# Patient Record
Sex: MALE | Race: WHITE | URBAN - METROPOLITAN AREA
[De-identification: names, ages, dates, MRNs, and addresses within clinical notes are randomized per-mention and may not be internally consistent; named-entity substitution may affect disease eponyms.]

---

## 2018-07-16 ENCOUNTER — APPOINTMENT (RX ONLY)
Dept: URBAN - METROPOLITAN AREA CLINIC 120 | Facility: CLINIC | Age: 28
Setting detail: DERMATOLOGY
End: 2018-07-16

## 2018-07-16 DIAGNOSIS — L70.1 ACNE CONGLOBATA: ICD-10-CM

## 2018-07-16 DIAGNOSIS — L73.2 HIDRADENITIS SUPPURATIVA: ICD-10-CM

## 2018-07-16 PROBLEM — L70.0 ACNE VULGARIS: Status: ACTIVE | Noted: 2018-07-16

## 2018-07-16 PROCEDURE — ? ADDITIONAL NOTES

## 2018-07-16 PROCEDURE — ? LAB REPORTS REVIEWED

## 2018-07-16 PROCEDURE — ? HIGH RISK MEDICATION MONITORING

## 2018-07-16 PROCEDURE — ? ISOTRETINOIN INITIATION

## 2018-07-16 PROCEDURE — 99203 OFFICE O/P NEW LOW 30 MIN: CPT

## 2018-07-16 PROCEDURE — ? COUNSELING

## 2018-07-16 NOTE — PROCEDURE: ADDITIONAL NOTES
Additional Notes: If labs WNL Rec. Absorica 65mg (40 mg QAM #30 ,25mg Qpm #30 No Refill)send to [facility] Additional Notes: If labs WNL Rec. Absorica 65mg (40 mg QAM #30 ,25mg Qpm #30 No Refill)send to [facility]